# Patient Record
Sex: FEMALE | Race: BLACK OR AFRICAN AMERICAN | Employment: FULL TIME | URBAN - METROPOLITAN AREA
[De-identification: names, ages, dates, MRNs, and addresses within clinical notes are randomized per-mention and may not be internally consistent; named-entity substitution may affect disease eponyms.]

---

## 2017-02-20 ENCOUNTER — TELEPHONE (OUTPATIENT)
Dept: OBGYN CLINIC | Facility: CLINIC | Age: 37
End: 2017-02-20

## 2017-02-20 NOTE — TELEPHONE ENCOUNTER
Per pt she has been on Necon ocp since 8 weeks. For past three months started having irregular menses. Wasn't supposed to start until next week. Having bleeding periods, reported mild cramping as well.  Finds it very unusual. Per pt she has been taking N

## 2017-02-21 NOTE — TELEPHONE ENCOUNTER
Sound like breakthrough bleeding. Recommend check pregnancy test, either home or office. Continue OCPs. Come see me after this next menses. May need switch to different ocp.

## 2017-02-21 NOTE — TELEPHONE ENCOUNTER
Pt informed of AJB response, verbalized understanding. Will be taking a pregnancy test at home. Apt made to follow up with OSWALDO in office 3/9/17.

## 2017-03-09 ENCOUNTER — OFFICE VISIT (OUTPATIENT)
Dept: OBGYN CLINIC | Facility: CLINIC | Age: 37
End: 2017-03-09

## 2017-03-09 VITALS — SYSTOLIC BLOOD PRESSURE: 123 MMHG | BODY MASS INDEX: 32 KG/M2 | DIASTOLIC BLOOD PRESSURE: 86 MMHG | WEIGHT: 202 LBS

## 2017-03-09 DIAGNOSIS — N93.9 ABNORMAL UTERINE BLEEDING (AUB): Primary | ICD-10-CM

## 2017-03-09 PROCEDURE — 99213 OFFICE O/P EST LOW 20 MIN: CPT | Performed by: OBSTETRICS & GYNECOLOGY

## 2017-03-10 NOTE — PROGRESS NOTES
HPI:    Patient ID: Sarah Burch is a 39year old female. HPI  Gyne problem visit  Abnormal uterine bleeding. Patient on ocp Necon. Has noticed last menses to be heavy with two heavy days and bled similar to a period the week before LMP of 2/28.   H uterine bleeding (aub)  (primary encounter diagnosis)  Recommend pelvic ultrasound to check endometrium and fibroid. Endometrial biopsy.   If endometrial mass or lesion seen, would consider hysteroscopy/D & C instead of endo bx    Meds This Visit:  No pres

## 2017-03-28 RX ORDER — NORETHINDRONE AND ETHINYL ESTRADIOL 0.5-0.035
KIT ORAL
Qty: 28 TABLET | Refills: 0 | Status: SHIPPED | OUTPATIENT
Start: 2017-03-28 | End: 2017-04-17

## 2017-03-30 ENCOUNTER — TELEPHONE (OUTPATIENT)
Dept: OBGYN CLINIC | Facility: CLINIC | Age: 37
End: 2017-03-30

## 2017-03-30 NOTE — TELEPHONE ENCOUNTER
Pt not home. Message left with her mother to return call to this office . Pt's mother verbalizes understanding.

## 2017-03-30 NOTE — TELEPHONE ENCOUNTER
PT NO SHOWED ENDO BX WITH DR. Joseph Alberts. TRIED TO CALL PT TO R/S BUT PT PICKED UP AND DIDNT SPEAK.

## 2017-03-31 NOTE — TELEPHONE ENCOUNTER
Please schedule pt for EMBX and SONO HYST (procedure 20 minutes) with Dr. Michaelle Mascorro  Left message for pt to call us back, pt no showed

## 2017-04-13 ENCOUNTER — TELEPHONE (OUTPATIENT)
Dept: OBGYN CLINIC | Facility: CLINIC | Age: 37
End: 2017-04-13

## 2017-04-13 ENCOUNTER — OFFICE VISIT (OUTPATIENT)
Dept: OBGYN CLINIC | Facility: CLINIC | Age: 37
End: 2017-04-13

## 2017-04-13 ENCOUNTER — HOSPITAL ENCOUNTER (OUTPATIENT)
Dept: ULTRASOUND IMAGING | Facility: HOSPITAL | Age: 37
Discharge: HOME | End: 2017-04-13
Attending: OBSTETRICS & GYNECOLOGY
Payer: COMMERCIAL

## 2017-04-13 VITALS — DIASTOLIC BLOOD PRESSURE: 82 MMHG | BODY MASS INDEX: 31 KG/M2 | WEIGHT: 200 LBS | SYSTOLIC BLOOD PRESSURE: 124 MMHG

## 2017-04-13 DIAGNOSIS — N92.1 METRORRHAGIA: ICD-10-CM

## 2017-04-13 DIAGNOSIS — N93.9 ABNORMAL UTERINE BLEEDING (AUB): ICD-10-CM

## 2017-04-13 DIAGNOSIS — N92.1 METRORRHAGIA: Primary | ICD-10-CM

## 2017-04-13 PROCEDURE — 81025 URINE PREGNANCY TEST: CPT | Performed by: OBSTETRICS & GYNECOLOGY

## 2017-04-13 PROCEDURE — 76830 TRANSVAGINAL US NON-OB: CPT

## 2017-04-13 PROCEDURE — 76856 US EXAM PELVIC COMPLETE: CPT

## 2017-04-13 PROCEDURE — 58100 BIOPSY OF UTERUS LINING: CPT | Performed by: OBSTETRICS & GYNECOLOGY

## 2017-04-13 PROCEDURE — 88305 TISSUE EXAM BY PATHOLOGIST: CPT | Performed by: OBSTETRICS & GYNECOLOGY

## 2017-04-13 PROCEDURE — 99212 OFFICE O/P EST SF 10 MIN: CPT | Performed by: OBSTETRICS & GYNECOLOGY

## 2017-04-13 RX ORDER — HYDROCODONE BITARTRATE AND IBUPROFEN 7.5; 2 MG/1; MG/1
TABLET, FILM COATED ORAL
COMMUNITY
Start: 2017-03-15

## 2017-04-13 NOTE — TELEPHONE ENCOUNTER
Informed pt that Dr Negar Hernández agreed for her to have a note to be off work tonight, but not tomorrow.  Pt would like to, again, request a note to be off work for tomorrow due to bleeding and cramping from endometrial biopsyPt voices saturating a sanitary pad in

## 2017-04-13 NOTE — PROGRESS NOTES
HPI:    Patient ID: Arturo Gerardo is a 39year old female. HPI  Presents for transvag u/s/saline sono + endo bx. Unable to do transvag u/s today due to new rule suspending TVUS until new probe sanitizing standards are completed.   Patient informed of this encounter.        Meds This Visit:  No prescriptions requested or ordered in this encounter       Imaging & Referrals:  None       XB#[id number]

## 2017-04-13 NOTE — PROCEDURES
Consent obtained and time out done. Pregnancy test negative (for premenopausal)    In lithotomy position a Graves (or Isaias) speculum was placed and the cervix visualized. The cervix and vagina were prepped with Betadine.     Endometrial biopsy was per

## 2017-04-13 NOTE — TELEPHONE ENCOUNTER
PT JUST HAD A PROCEDURE DONE TODAY, WOULD LIKE A NOTE TO BE OFF FROM WORK 2 NIGHTS, PT WORK ON HER FEET ALL DAY

## 2017-04-17 ENCOUNTER — TELEPHONE (OUTPATIENT)
Dept: OBGYN CLINIC | Facility: CLINIC | Age: 37
End: 2017-04-17

## 2017-04-17 NOTE — TELEPHONE ENCOUNTER
Informed pt of Dr. Caroline Purvis new order for her. Informed pt her EMBx did not show any hyperplasia or suspicious tissue. Informed pt that Dr Jorgito Whipple ordered her a new BCP, Ortho-Novum 7/7/7. She should begin her new cycle of Ortho-Novum this Sunday 4/23/17. Hopefully this will improve her menstrual cycle over the next  2 months. Informed pt to follow up with Dr. Jorgito Whipple after her next 2 cycles if needed. Pt verbalizes understanding to theses orders. Pt currently voices she is saturating 1 sanitary pad in 1 1/2 hours. No clots noted. Pt, due to the heaviness of her menstrual cycle and abdominal cramps, is requesting a letter to be off work for the rest of the week. Can she have a letter for work? Please advise.

## 2017-04-17 NOTE — TELEPHONE ENCOUNTER
Please inform the patient that the results of her endometrial biopsy did not show any hyperplasia or suspicious tissue. At the conclusion of her current pill package, I would like to switch her to a different oral contraceptive. ERx sent for Ortho-Novum 7/7/7. Should hopefully improve menstrual cycles over the next 2 months. Should follow-up with me after the next 2 cycles to evaluate if needed.

## 2017-04-18 NOTE — TELEPHONE ENCOUNTER
Patient aware letter ready for her work, she will pick it up today or tmrw, no further questions at this time

## 2017-04-24 ENCOUNTER — TELEPHONE (OUTPATIENT)
Dept: OBGYN CLINIC | Facility: CLINIC | Age: 37
End: 2017-04-24

## 2017-04-25 NOTE — TELEPHONE ENCOUNTER
Spoke w/ pt & reminded of need for annual & pap. Scheduled appt for pt at her request. Although pt usually sees [facility], pt only has Thursdays available & requested to schedule with another provider.  Appt made w/ [facility]

## 2017-05-04 ENCOUNTER — TELEPHONE (OUTPATIENT)
Dept: OBGYN CLINIC | Facility: CLINIC | Age: 37
End: 2017-05-04

## 2017-05-04 ENCOUNTER — LAB ENCOUNTER (OUTPATIENT)
Dept: LAB | Facility: HOSPITAL | Age: 37
End: 2017-05-04
Attending: ADVANCED PRACTICE MIDWIFE
Payer: COMMERCIAL

## 2017-05-04 ENCOUNTER — OFFICE VISIT (OUTPATIENT)
Dept: OBGYN CLINIC | Facility: CLINIC | Age: 37
End: 2017-05-04

## 2017-05-04 VITALS
WEIGHT: 197.25 LBS | HEIGHT: 67 IN | BODY MASS INDEX: 30.96 KG/M2 | SYSTOLIC BLOOD PRESSURE: 130 MMHG | DIASTOLIC BLOOD PRESSURE: 85 MMHG | HEART RATE: 101 BPM

## 2017-05-04 DIAGNOSIS — Z13.29 SCREENING FOR THYROID DISORDER: ICD-10-CM

## 2017-05-04 DIAGNOSIS — Z13.1 SCREENING FOR DIABETES MELLITUS: ICD-10-CM

## 2017-05-04 DIAGNOSIS — Z01.419 WOMEN'S ANNUAL ROUTINE GYNECOLOGICAL EXAMINATION: Primary | ICD-10-CM

## 2017-05-04 DIAGNOSIS — Z13.1 SCREENING FOR DIABETES MELLITUS: Primary | ICD-10-CM

## 2017-05-04 PROCEDURE — 36415 COLL VENOUS BLD VENIPUNCTURE: CPT

## 2017-05-04 PROCEDURE — 84443 ASSAY THYROID STIM HORMONE: CPT

## 2017-05-04 PROCEDURE — 83036 HEMOGLOBIN GLYCOSYLATED A1C: CPT

## 2017-05-04 PROCEDURE — 99395 PREV VISIT EST AGE 18-39: CPT | Performed by: ADVANCED PRACTICE MIDWIFE

## 2017-05-04 NOTE — TELEPHONE ENCOUNTER
----- Message from MICHELLE Vigil sent at 5/4/2017  4:02 PM CDT -----  Please inform patient that her thyroid function was normal as well as her diabetes screening. Thanks.

## 2017-05-04 NOTE — PROGRESS NOTES
HPI:    Patient ID: Kim Macdonald is a 39year old female. HPI Comments: She presents for annual exam. She denies history of medical problems or any health complaints. Last pap 4/16 +hyperkeratosis, otherwise normal and negative HPV.   Denies history Montelukast Sodium 10 MG Oral Tab Take 1 tablet (10 mg total) by mouth nightly. Disp: 30 tablet Rfl: 0   Mometasone Furoate (NASONEX) 50 MCG/ACT Nasal Suspension 2 sprays by Nasal route 2 (two) times daily.  Disp: 1 Inhaler Rfl: 0   Levocetirizine Dihydro bleeding in the vagina. No foreign body around the vagina. No signs of injury around the vagina. No vaginal discharge found. Friable cervix   Musculoskeletal: Normal range of motion. Lymphadenopathy:     She has no cervical adenopathy.         Right: No

## 2017-05-05 NOTE — TELEPHONE ENCOUNTER
Informed pt that thyroid function was normal and diabetes screening was normal. Pt verbalized understanding and agrees with plan.

## 2017-05-09 ENCOUNTER — TELEPHONE (OUTPATIENT)
Dept: OBGYN CLINIC | Facility: CLINIC | Age: 37
End: 2017-05-09

## 2017-05-09 NOTE — TELEPHONE ENCOUNTER
----- Message from MICHELLE Andrea sent at 5/9/2017 11:13 AM CDT -----  Please inform patient that her pap was normal and negative for HPV but that it still demonstrated inflammation or hyperkeratosis.   I would like to repeat in 1 more year, if that fi

## 2017-05-10 NOTE — TELEPHONE ENCOUNTER
Spoke w/ pt & advised of normal pap & neg HPV w/ demonstrated inflammation per [facility]. Informed pt of 's recs . Pt would like to have prescription for estrogen cream available now.  Explained to pt that [facility] would be out of the office until tomorrow but ll send

## 2017-05-11 NOTE — TELEPHONE ENCOUNTER
Reviewed management options for hyperkeratosis and relative non-concern of finding. Discussed with patient's history of maternal breast CA at age 38 estrogen therapy would best be avoided. Plan to repeat pap in 1 year.  Patient stated understanding and ag

## 2017-06-12 ENCOUNTER — TELEPHONE (OUTPATIENT)
Dept: ALLERGY | Facility: CLINIC | Age: 37
End: 2017-06-12

## 2017-06-12 NOTE — TELEPHONE ENCOUNTER
Dr. Johann Obrien, order reminder letter mailed to home address on file and labs postponed x 3 months ordered on 12/13/16.

## 2017-09-14 RX ORDER — VERAPAMIL HCL 80 MG
TABLET ORAL
Qty: 8.7 G | Refills: 0 | Status: SHIPPED | OUTPATIENT
Start: 2017-09-14

## 2017-09-14 NOTE — TELEPHONE ENCOUNTER
Refill requested for QVAR 80MCG ORAL INHALER 120 DOSES  Will file in chart as: QVAR 80 MCG/ACT Inhalation Aero Soln  INHALE 2 PUFFS BY MOUTH INTO THE LUNGS TWICE DAILY       Disp: [id number] g Refills: 0    Class: Normal Start: 9/13/2017   Originally orde

## 2017-09-14 NOTE — TELEPHONE ENCOUNTER
LM notifying patient that she must be seen in office before additional refills can be sent to pharmacy after this 30 day supply. PSR please schedule a 15 minute follow up appointment with Dr. Kallie Martinez.  Thank you

## 2017-09-14 NOTE — TELEPHONE ENCOUNTER
Call reviewed and noted. Qvar refilled ×1. Patient last seen in December 2016 and overdue for six-month follow-up. Please call to schedule a follow-up visit.   No further refills until patient is seen in office

## 2018-02-08 ENCOUNTER — HOSPITAL (OUTPATIENT)
Dept: OTHER | Age: 38
End: 2018-02-08
Attending: OTOLARYNGOLOGY

## 2018-06-25 ENCOUNTER — HOSPITAL ENCOUNTER (EMERGENCY)
Facility: HOSPITAL | Age: 38
Discharge: HOME | End: 2018-06-25
Attending: EMERGENCY MEDICINE
Payer: COMMERCIAL

## 2018-06-25 VITALS
RESPIRATION RATE: 20 BRPM | WEIGHT: 195 LBS | HEIGHT: 67 IN | BODY MASS INDEX: 30.61 KG/M2 | OXYGEN SATURATION: 98 % | TEMPERATURE: 98 F | HEART RATE: 98 BPM | SYSTOLIC BLOOD PRESSURE: 135 MMHG | DIASTOLIC BLOOD PRESSURE: 99 MMHG

## 2018-06-25 DIAGNOSIS — H10.12 ALLERGIC CONJUNCTIVITIS OF LEFT EYE: Primary | ICD-10-CM

## 2018-06-25 PROCEDURE — 99283 EMERGENCY DEPT VISIT LOW MDM: CPT

## 2018-06-25 RX ORDER — KETOTIFEN FUMARATE 0.35 MG/ML
1 SOLUTION/ DROPS OPHTHALMIC 2 TIMES DAILY
Qty: 5 ML | Refills: 0 | Status: SHIPPED | OUTPATIENT
Start: 2018-06-25

## 2018-06-25 NOTE — ED PROVIDER NOTES
Patient Seen in: [facility] AND [facility] Emergency Department    History   Patient presents with:   Eye Visual Problem (opthalmic)    Stated Complaint: left eye problem    HPI    42-year-old female with past medical history significant for asthma presents emerg exudates  Eyes: EOM are normal. PERRLA, left eye with mild scleral erythema and injection in the inferior lateral aspect of the sclera, there is no fluorescein uptake and both eyes have normal visual acuity  Neck: Normal range of motion. Neck supple.  No tr

## 2018-06-25 NOTE — ED INITIAL ASSESSMENT (HPI)
Pt presents to the ED for the c/o left eye redness to the cornea.  Also reports scratchy/stabbing pain

## 2019-06-10 ENCOUNTER — HOSPITAL ENCOUNTER (EMERGENCY)
Facility: HOSPITAL | Age: 39
Discharge: HOME | End: 2019-06-10
Attending: EMERGENCY MEDICINE
Payer: COMMERCIAL

## 2019-06-10 VITALS
WEIGHT: 195 LBS | HEIGHT: 67 IN | BODY MASS INDEX: 30.61 KG/M2 | SYSTOLIC BLOOD PRESSURE: 131 MMHG | RESPIRATION RATE: 18 BRPM | DIASTOLIC BLOOD PRESSURE: 109 MMHG | OXYGEN SATURATION: 100 % | HEART RATE: 107 BPM | TEMPERATURE: 99 F

## 2019-06-10 DIAGNOSIS — H10.33 ACUTE CONJUNCTIVITIS OF BOTH EYES, UNSPECIFIED ACUTE CONJUNCTIVITIS TYPE: Primary | ICD-10-CM

## 2019-06-10 DIAGNOSIS — H11.423 CHEMOSIS OF CONJUNCTIVA OF BOTH EYES: ICD-10-CM

## 2019-06-10 PROCEDURE — 99283 EMERGENCY DEPT VISIT LOW MDM: CPT

## 2019-06-10 RX ORDER — TOBRAMYCIN AND DEXAMETHASONE 3; 1 MG/ML; MG/ML
2 SUSPENSION/ DROPS OPHTHALMIC
Qty: 1 BOTTLE | Refills: 0 | Status: SHIPPED | OUTPATIENT
Start: 2019-06-10 | End: 2019-06-15

## 2019-06-10 RX ORDER — OLOPATADINE HYDROCHLORIDE 1 MG/ML
1 SOLUTION/ DROPS OPHTHALMIC 2 TIMES DAILY
Qty: 5 ML | Refills: 0 | Status: SHIPPED | OUTPATIENT
Start: 2019-06-10

## 2019-06-10 NOTE — ED INITIAL ASSESSMENT (HPI)
Bilateral eye swelling, burning and pain since yesterday.  + sensitivity to light. +tearing and crusting

## 2019-06-10 NOTE — ED PROVIDER NOTES
Patient Seen in: [facility] AND [facility] Emergency Department    History   Patient presents with:  Eye Pain    Stated Complaint: burning eyes    HPI    27-year-old female with no significant past medical history here with complaints of bilateral eye swelling, HPI.  Constitutional and vital signs reviewed. All other systems reviewed and negative except as noted above.     Physical Exam     ED Triage Vitals [06/10/19 1354]   /79   Pulse 114   Resp 20   Temp 99.3 °F (37.4 °C)   Temp src Oral   SpO2 98 % hour(s)). Imaging Results Available and Reviewed by me while in ED:          EMERGENCY DEPARTMENT COURSE AND TREATMENT:  Patient's condition was stable during Emergency Department evaluation.      38yoF with b/l eye redness/swelling  - I personally destiny Ophthalmic Suspension  Apply 2 drops to eye every 2 (two) hours while awake for 5 days. , Print Script, Disp-1 Bottle, R-0    Olopatadine HCl (PATANOL) 0.1 % Ophthalmic Solution  Place 1 drop into both eyes 2 (two) times daily. , Normal, Disp-5 mL, R-0

## 2019-08-30 ENCOUNTER — HOSPITAL ENCOUNTER (EMERGENCY)
Facility: HOSPITAL | Age: 39
Discharge: HOME | End: 2019-08-30
Attending: EMERGENCY MEDICINE
Payer: COMMERCIAL

## 2019-08-30 VITALS
DIASTOLIC BLOOD PRESSURE: 105 MMHG | OXYGEN SATURATION: 99 % | HEART RATE: 98 BPM | TEMPERATURE: 98 F | RESPIRATION RATE: 20 BRPM | SYSTOLIC BLOOD PRESSURE: 153 MMHG

## 2019-08-30 DIAGNOSIS — R11.2 NAUSEA VOMITING AND DIARRHEA: Primary | ICD-10-CM

## 2019-08-30 DIAGNOSIS — R19.7 NAUSEA VOMITING AND DIARRHEA: Primary | ICD-10-CM

## 2019-08-30 DIAGNOSIS — R10.9 ABDOMINAL CRAMPING: ICD-10-CM

## 2019-08-30 LAB
ANION GAP SERPL CALC-SCNC: 9 MMOL/L (ref 0–18)
B-HCG UR QL: NEGATIVE
BASOPHILS # BLD AUTO: 0.01 X10(3) UL (ref 0–0.2)
BASOPHILS NFR BLD AUTO: 0.1 %
BILIRUB UR QL: NEGATIVE
BUN BLD-MCNC: 5 MG/DL (ref 7–18)
BUN/CREAT SERPL: 5.5 (ref 10–20)
CALCIUM BLD-MCNC: 9.2 MG/DL (ref 8.5–10.1)
CHLORIDE SERPL-SCNC: 105 MMOL/L (ref 98–112)
CO2 SERPL-SCNC: 24 MMOL/L (ref 21–32)
COLOR UR: YELLOW
CREAT BLD-MCNC: 0.91 MG/DL (ref 0.55–1.02)
DEPRECATED RDW RBC AUTO: 43.8 FL (ref 35.1–46.3)
EOSINOPHIL # BLD AUTO: 0.06 X10(3) UL (ref 0–0.7)
EOSINOPHIL NFR BLD AUTO: 0.6 %
ERYTHROCYTE [DISTWIDTH] IN BLOOD BY AUTOMATED COUNT: 14.3 % (ref 11–15)
GLUCOSE BLD-MCNC: 101 MG/DL (ref 70–99)
GLUCOSE UR-MCNC: NEGATIVE MG/DL
HCT VFR BLD AUTO: 39.6 % (ref 35–48)
HGB BLD-MCNC: 12.5 G/DL (ref 12–16)
IMM GRANULOCYTES # BLD AUTO: 0.03 X10(3) UL (ref 0–1)
IMM GRANULOCYTES NFR BLD: 0.3 %
KETONES UR-MCNC: 20 MG/DL
LEUKOCYTE ESTERASE UR QL STRIP.AUTO: NEGATIVE
LYMPHOCYTES # BLD AUTO: 1.34 X10(3) UL (ref 1–4)
LYMPHOCYTES NFR BLD AUTO: 14.2 %
MCH RBC QN AUTO: 26.6 PG (ref 26–34)
MCHC RBC AUTO-ENTMCNC: 31.6 G/DL (ref 31–37)
MCV RBC AUTO: 84.3 FL (ref 80–100)
MONOCYTES # BLD AUTO: 0.51 X10(3) UL (ref 0.1–1)
MONOCYTES NFR BLD AUTO: 5.4 %
NEUTROPHILS # BLD AUTO: 7.49 X10 (3) UL (ref 1.5–7.7)
NEUTROPHILS # BLD AUTO: 7.49 X10(3) UL (ref 1.5–7.7)
NEUTROPHILS NFR BLD AUTO: 79.4 %
NITRITE UR QL STRIP.AUTO: NEGATIVE
OSMOLALITY SERPL CALC.SUM OF ELEC: 283 MOSM/KG (ref 275–295)
PH UR: 5 [PH] (ref 5–8)
PLATELET # BLD AUTO: 403 10(3)UL (ref 150–450)
POTASSIUM SERPL-SCNC: 3.8 MMOL/L (ref 3.5–5.1)
PROT UR-MCNC: NEGATIVE MG/DL
RBC # BLD AUTO: 4.7 X10(6)UL (ref 3.8–5.3)
RBC #/AREA URNS AUTO: 5 /HPF
SODIUM SERPL-SCNC: 138 MMOL/L (ref 136–145)
SP GR UR STRIP: 1.01 (ref 1–1.03)
UROBILINOGEN UR STRIP-ACNC: <2
VIT C UR-MCNC: NEGATIVE MG/DL
WBC # BLD AUTO: 9.4 X10(3) UL (ref 4–11)
WBC #/AREA URNS AUTO: 2 /HPF

## 2019-08-30 PROCEDURE — 81025 URINE PREGNANCY TEST: CPT

## 2019-08-30 PROCEDURE — 96374 THER/PROPH/DIAG INJ IV PUSH: CPT

## 2019-08-30 PROCEDURE — 99284 EMERGENCY DEPT VISIT MOD MDM: CPT

## 2019-08-30 PROCEDURE — 85025 COMPLETE CBC W/AUTO DIFF WBC: CPT | Performed by: EMERGENCY MEDICINE

## 2019-08-30 PROCEDURE — 80048 BASIC METABOLIC PNL TOTAL CA: CPT | Performed by: EMERGENCY MEDICINE

## 2019-08-30 PROCEDURE — 81001 URINALYSIS AUTO W/SCOPE: CPT | Performed by: EMERGENCY MEDICINE

## 2019-08-30 RX ORDER — DICYCLOMINE HCL 20 MG
20 TABLET ORAL 4 TIMES DAILY PRN
Qty: 30 TABLET | Refills: 0 | Status: SHIPPED | OUTPATIENT
Start: 2019-08-30

## 2019-08-30 RX ORDER — ONDANSETRON 2 MG/ML
4 INJECTION INTRAMUSCULAR; INTRAVENOUS ONCE
Status: COMPLETED | OUTPATIENT
Start: 2019-08-30 | End: 2019-08-30

## 2019-08-30 RX ORDER — DICYCLOMINE HCL 20 MG
20 TABLET ORAL ONCE
Status: COMPLETED | OUTPATIENT
Start: 2019-08-30 | End: 2019-08-30

## 2019-08-30 RX ORDER — ONDANSETRON 4 MG/1
4 TABLET, ORALLY DISINTEGRATING ORAL EVERY 4 HOURS PRN
Qty: 15 TABLET | Refills: 0 | Status: SHIPPED | OUTPATIENT
Start: 2019-08-30

## 2019-08-31 NOTE — ED NOTES
PT IS HERE WITH REPORTED FOOD POISONING. PT STS  HAVING VOMITING AND DIARRHEA SINCE Tuesday. PT STS HAVING 5 EPISODES OF VOMITING AND THE DIARRHEA HOURLY. PT STS EATING OUT. PT DENIED FEVERS.

## 2019-08-31 NOTE — ED PROVIDER NOTES
Patient Seen in: [facility] Emergency Department    History   Patient presents with:  Abdomen/Flank Pain (GI/)  Nausea/Vomiting/Diarrhea (gastrointestinal)      HPI    Patient presents to the ED complaining of intermittent abdominal cramping and (Room air)       Physical Exam   Constitutional: She is oriented to person, place, and time. She appears well-developed. No distress. Obese   HENT:   Head: Normocephalic and atraumatic. Eyes: Conjunctivae are normal. Right eye exhibits no discharge.  Radha Herrera CBC W/ DIFFERENTIAL         Imaging Results Available and Reviewed while in ED:     ED Medications Administered:   Medications   ondansetron HCl (ZOFRAN) injection 4 mg (4 mg Intravenous Given 8/30/19 2108)   sodium chloride 0.9% IV bolus 1,000 mL (0 mL Medication List as of 8/30/2019  9:32 PM    START taking these medications    ondansetron 4 MG Oral Tablet Dispersible  Take 1 tablet (4 mg total) by mouth every 4 (four) hours as needed for Nausea. , Print, Disp-15 tablet, R-0    Dicyclomine HCl 20 MG Oral

## 2019-09-01 ENCOUNTER — APPOINTMENT (OUTPATIENT)
Dept: CT IMAGING | Facility: HOSPITAL | Age: 39
End: 2019-09-01
Attending: EMERGENCY MEDICINE
Payer: COMMERCIAL

## 2019-09-01 ENCOUNTER — HOSPITAL ENCOUNTER (EMERGENCY)
Facility: HOSPITAL | Age: 39
Discharge: HOME | End: 2019-09-01
Attending: EMERGENCY MEDICINE
Payer: COMMERCIAL

## 2019-09-01 VITALS
WEIGHT: 185 LBS | DIASTOLIC BLOOD PRESSURE: 78 MMHG | RESPIRATION RATE: 18 BRPM | OXYGEN SATURATION: 98 % | TEMPERATURE: 98 F | BODY MASS INDEX: 29 KG/M2 | SYSTOLIC BLOOD PRESSURE: 122 MMHG | HEART RATE: 128 BPM

## 2019-09-01 DIAGNOSIS — R19.7 NAUSEA VOMITING AND DIARRHEA: Primary | ICD-10-CM

## 2019-09-01 DIAGNOSIS — R11.2 NAUSEA VOMITING AND DIARRHEA: Primary | ICD-10-CM

## 2019-09-01 DIAGNOSIS — R10.30 LOWER ABDOMINAL PAIN: ICD-10-CM

## 2019-09-01 PROCEDURE — 74177 CT ABD & PELVIS W/CONTRAST: CPT | Performed by: EMERGENCY MEDICINE

## 2019-09-01 PROCEDURE — 96375 TX/PRO/DX INJ NEW DRUG ADDON: CPT

## 2019-09-01 PROCEDURE — 99285 EMERGENCY DEPT VISIT HI MDM: CPT

## 2019-09-01 PROCEDURE — 96374 THER/PROPH/DIAG INJ IV PUSH: CPT

## 2019-09-01 PROCEDURE — 94640 AIRWAY INHALATION TREATMENT: CPT

## 2019-09-01 PROCEDURE — 96361 HYDRATE IV INFUSION ADD-ON: CPT

## 2019-09-01 RX ORDER — TRAMADOL HYDROCHLORIDE 50 MG/1
50 TABLET ORAL ONCE
Status: COMPLETED | OUTPATIENT
Start: 2019-09-01 | End: 2019-09-01

## 2019-09-01 RX ORDER — DEXAMETHASONE SODIUM PHOSPHATE 10 MG/ML
10 INJECTION, SOLUTION INTRAMUSCULAR; INTRAVENOUS ONCE
Status: COMPLETED | OUTPATIENT
Start: 2019-09-01 | End: 2019-09-01

## 2019-09-01 RX ORDER — IPRATROPIUM BROMIDE AND ALBUTEROL SULFATE 2.5; .5 MG/3ML; MG/3ML
3 SOLUTION RESPIRATORY (INHALATION) ONCE
Status: COMPLETED | OUTPATIENT
Start: 2019-09-01 | End: 2019-09-01

## 2019-09-01 RX ORDER — KETOROLAC TROMETHAMINE 30 MG/ML
30 INJECTION, SOLUTION INTRAMUSCULAR; INTRAVENOUS ONCE
Status: COMPLETED | OUTPATIENT
Start: 2019-09-01 | End: 2019-09-01

## 2019-09-01 RX ORDER — TRAMADOL HYDROCHLORIDE 50 MG/1
50 TABLET ORAL ONCE
Status: DISCONTINUED | OUTPATIENT
Start: 2019-09-01 | End: 2019-09-01

## 2019-09-01 RX ORDER — ONDANSETRON 2 MG/ML
4 INJECTION INTRAMUSCULAR; INTRAVENOUS ONCE
Status: COMPLETED | OUTPATIENT
Start: 2019-09-01 | End: 2019-09-01

## 2019-09-01 RX ORDER — ONDANSETRON 4 MG/1
4 TABLET, ORALLY DISINTEGRATING ORAL EVERY 4 HOURS PRN
Qty: 15 TABLET | Refills: 0 | Status: SHIPPED | OUTPATIENT
Start: 2019-09-01

## 2019-09-01 RX ORDER — TRAMADOL HYDROCHLORIDE 50 MG/1
TABLET ORAL EVERY 6 HOURS PRN
Qty: 20 TABLET | Refills: 0 | Status: SHIPPED | OUTPATIENT
Start: 2019-09-01

## 2019-09-01 NOTE — ED INITIAL ASSESSMENT (HPI)
Pt c/o lower abdominal pain with NVD, was seen here yesterday for same problem. She states that pain is worse. Was prescribed zofran and dicyclomine yesterday, stats they are not helping.

## 2019-09-02 NOTE — ED PROVIDER NOTES
Patient Seen in: [facility] Emergency Department    History   Patient presents with:  Abdomen/Flank Pain (GI/)      HPI    Patient presents to the ED complaining of ongoing lower abdominal pain for the past 4 days.   Pain is crampy and comes and 20   Temp 98.3 °F (36.8 °C)   Temp src Oral   SpO2 100 %   O2 Device None (Room air)       Physical Exam   Constitutional: She is oriented to person, place, and time. She appears well-developed and well-nourished. No distress.    HENT:   Head: Normocephalic (ULTRAM) tab 50 mg (50 mg Oral Not Given 9/1/19 2010)   ondansetron HCl (ZOFRAN) injection 4 mg (4 mg Intravenous Given 9/1/19 1730)   sodium chloride 0.9% IV bolus 1,000 mL (0 mL Intravenous Stopped 9/1/19 1900)   ketorolac tromethamine (TORADOL) 30 MG/ML antiemetics and fluids. Advised on outpatient gastroenterology follow-up. Home on tramadol for pain control and continued Zofran. Additional verbal instructions were given and discussed with the patient and/or caregiver.       Condition upon leaving th

## 2019-09-02 NOTE — ED NOTES
Pt c/o wheezing and in need of her inhaler. Respiratory therapy called for duo neb. Pt's heart rate noted to be elevated. ER MD aware.

## 2019-09-03 ENCOUNTER — TELEPHONE (OUTPATIENT)
Dept: INTERNAL MEDICINE CLINIC | Facility: CLINIC | Age: 39
End: 2019-09-03

## 2019-09-03 NOTE — TELEPHONE ENCOUNTER
I have not seen this pt. She can see any PCP in the clinic. I have no new patient openings for next Thursday, but I often get cancellations. She could call Wednesday afternoon, or she could see another PCP.

## 2019-09-03 NOTE — TELEPHONE ENCOUNTER
Pt states she was in the emergency room and was told to follow up with her pcp. Per pt she was seen for food poisoning and also a cyst pt states she is off next Thursday 09/12/19 and would like to know if she can be added to schedule.

## 2020-08-02 ENCOUNTER — HOSPITAL ENCOUNTER (EMERGENCY)
Facility: HOSPITAL | Age: 40
Discharge: HOME | End: 2020-08-02
Attending: EMERGENCY MEDICINE
Payer: COMMERCIAL

## 2020-08-02 VITALS
DIASTOLIC BLOOD PRESSURE: 103 MMHG | HEART RATE: 102 BPM | TEMPERATURE: 99 F | RESPIRATION RATE: 20 BRPM | OXYGEN SATURATION: 98 % | SYSTOLIC BLOOD PRESSURE: 136 MMHG

## 2020-08-02 DIAGNOSIS — R11.2 NAUSEA VOMITING AND DIARRHEA: Primary | ICD-10-CM

## 2020-08-02 DIAGNOSIS — R19.7 NAUSEA VOMITING AND DIARRHEA: Primary | ICD-10-CM

## 2020-08-02 LAB
ALBUMIN SERPL-MCNC: 3.2 G/DL (ref 3.4–5)
ALP LIVER SERPL-CCNC: 71 U/L (ref 37–98)
ALT SERPL-CCNC: 16 U/L (ref 13–56)
ANION GAP SERPL CALC-SCNC: 4 MMOL/L (ref 0–18)
AST SERPL-CCNC: 12 U/L (ref 15–37)
B-HCG UR QL: NEGATIVE
BACTERIA UR QL AUTO: NEGATIVE /HPF
BASOPHILS # BLD AUTO: 0.02 X10(3) UL (ref 0–0.2)
BASOPHILS NFR BLD AUTO: 0.2 %
BILIRUB DIRECT SERPL-MCNC: 0.1 MG/DL (ref 0–0.2)
BILIRUB SERPL-MCNC: 0.4 MG/DL (ref 0.1–2)
BILIRUB UR QL: NEGATIVE
BUN BLD-MCNC: 5 MG/DL (ref 7–18)
BUN/CREAT SERPL: 6.1 (ref 10–20)
CALCIUM BLD-MCNC: 8.9 MG/DL (ref 8.5–10.1)
CHLORIDE SERPL-SCNC: 106 MMOL/L (ref 98–112)
CLARITY UR: CLEAR
CO2 SERPL-SCNC: 27 MMOL/L (ref 21–32)
COLOR UR: YELLOW
CREAT BLD-MCNC: 0.82 MG/DL (ref 0.55–1.02)
DEPRECATED RDW RBC AUTO: 45.3 FL (ref 35.1–46.3)
EOSINOPHIL # BLD AUTO: 0.12 X10(3) UL (ref 0–0.7)
EOSINOPHIL NFR BLD AUTO: 1.2 %
ERYTHROCYTE [DISTWIDTH] IN BLOOD BY AUTOMATED COUNT: 14.8 % (ref 11–15)
GLUCOSE BLD-MCNC: 101 MG/DL (ref 70–99)
GLUCOSE UR-MCNC: NEGATIVE MG/DL
HCT VFR BLD AUTO: 37.7 % (ref 35–48)
HGB BLD-MCNC: 12 G/DL (ref 12–16)
IMM GRANULOCYTES # BLD AUTO: 0.03 X10(3) UL (ref 0–1)
IMM GRANULOCYTES NFR BLD: 0.3 %
KETONES UR-MCNC: NEGATIVE MG/DL
LEUKOCYTE ESTERASE UR QL STRIP.AUTO: NEGATIVE
LIPASE SERPL-CCNC: 72 U/L (ref 73–393)
LYMPHOCYTES # BLD AUTO: 1.4 X10(3) UL (ref 1–4)
LYMPHOCYTES NFR BLD AUTO: 14.3 %
M PROTEIN MFR SERPL ELPH: 6.9 G/DL (ref 6.4–8.2)
MCH RBC QN AUTO: 26.5 PG (ref 26–34)
MCHC RBC AUTO-ENTMCNC: 31.8 G/DL (ref 31–37)
MCV RBC AUTO: 83.4 FL (ref 80–100)
MONOCYTES # BLD AUTO: 0.49 X10(3) UL (ref 0.1–1)
MONOCYTES NFR BLD AUTO: 5 %
NEUTROPHILS # BLD AUTO: 7.73 X10 (3) UL (ref 1.5–7.7)
NEUTROPHILS # BLD AUTO: 7.73 X10(3) UL (ref 1.5–7.7)
NEUTROPHILS NFR BLD AUTO: 79 %
NITRITE UR QL STRIP.AUTO: NEGATIVE
OSMOLALITY SERPL CALC.SUM OF ELEC: 281 MOSM/KG (ref 275–295)
PH UR: 6 [PH] (ref 5–8)
PLATELET # BLD AUTO: 508 10(3)UL (ref 150–450)
POTASSIUM SERPL-SCNC: 3.3 MMOL/L (ref 3.5–5.1)
PROT UR-MCNC: NEGATIVE MG/DL
RBC # BLD AUTO: 4.52 X10(6)UL (ref 3.8–5.3)
RBC #/AREA URNS AUTO: <1 /HPF
SODIUM SERPL-SCNC: 137 MMOL/L (ref 136–145)
SP GR UR STRIP: 1 (ref 1–1.03)
UROBILINOGEN UR STRIP-ACNC: <2
WBC # BLD AUTO: 9.8 X10(3) UL (ref 4–11)
WBC #/AREA URNS AUTO: 1 /HPF

## 2020-08-02 PROCEDURE — 83690 ASSAY OF LIPASE: CPT | Performed by: EMERGENCY MEDICINE

## 2020-08-02 PROCEDURE — 96361 HYDRATE IV INFUSION ADD-ON: CPT

## 2020-08-02 PROCEDURE — 80048 BASIC METABOLIC PNL TOTAL CA: CPT | Performed by: EMERGENCY MEDICINE

## 2020-08-02 PROCEDURE — 85025 COMPLETE CBC W/AUTO DIFF WBC: CPT | Performed by: EMERGENCY MEDICINE

## 2020-08-02 PROCEDURE — 81025 URINE PREGNANCY TEST: CPT

## 2020-08-02 PROCEDURE — 99284 EMERGENCY DEPT VISIT MOD MDM: CPT

## 2020-08-02 PROCEDURE — 96374 THER/PROPH/DIAG INJ IV PUSH: CPT

## 2020-08-02 PROCEDURE — 81001 URINALYSIS AUTO W/SCOPE: CPT | Performed by: EMERGENCY MEDICINE

## 2020-08-02 PROCEDURE — 80076 HEPATIC FUNCTION PANEL: CPT | Performed by: EMERGENCY MEDICINE

## 2020-08-02 PROCEDURE — 96372 THER/PROPH/DIAG INJ SC/IM: CPT

## 2020-08-02 RX ORDER — DICYCLOMINE HYDROCHLORIDE 10 MG/ML
20 INJECTION INTRAMUSCULAR ONCE
Status: COMPLETED | OUTPATIENT
Start: 2020-08-02 | End: 2020-08-02

## 2020-08-02 RX ORDER — POTASSIUM CHLORIDE 20 MEQ/1
40 TABLET, EXTENDED RELEASE ORAL ONCE
Status: COMPLETED | OUTPATIENT
Start: 2020-08-02 | End: 2020-08-02

## 2020-08-02 RX ORDER — DICYCLOMINE HCL 20 MG
20 TABLET ORAL 4 TIMES DAILY PRN
Qty: 30 TABLET | Refills: 0 | Status: SHIPPED | OUTPATIENT
Start: 2020-08-02 | End: 2020-09-01

## 2020-08-02 RX ORDER — ONDANSETRON 2 MG/ML
4 INJECTION INTRAMUSCULAR; INTRAVENOUS ONCE
Status: COMPLETED | OUTPATIENT
Start: 2020-08-02 | End: 2020-08-02

## 2020-08-02 RX ORDER — ONDANSETRON 4 MG/1
4 TABLET, ORALLY DISINTEGRATING ORAL EVERY 4 HOURS PRN
Qty: 10 TABLET | Refills: 0 | Status: SHIPPED | OUTPATIENT
Start: 2020-08-02 | End: 2020-08-09

## 2020-08-02 NOTE — ED PROVIDER NOTES
Patient Seen in: [facility] AND [facility] Emergency Department      History   Patient presents with:  Abdomen/Flank Pain    Stated Complaint: abd pain    HPI    21-year-old female with asthma presenting for evaluation of nausea vomiting and diarrhea with interm of motion. Cardiovascular:      Rate and Rhythm: Normal rate and regular rhythm. Heart sounds: Normal heart sounds. Pulmonary:      Effort: Pulmonary effort is normal.      Breath sounds: Normal breath sounds.    Abdominal:      General: There is n -----------         ------                     CBC W/ DIFFERENTIAL[[id number]]          Abnormal            Final result                 Please view results for these tests on the individual orders.                   MDM   Well-appearing, well-hydrated 40-ye

## 2020-08-03 ENCOUNTER — HOSPITAL ENCOUNTER (EMERGENCY)
Facility: HOSPITAL | Age: 40
Discharge: HOME | End: 2020-08-04
Attending: EMERGENCY MEDICINE
Payer: COMMERCIAL

## 2020-08-03 ENCOUNTER — APPOINTMENT (OUTPATIENT)
Dept: CT IMAGING | Facility: HOSPITAL | Age: 40
End: 2020-08-03
Attending: EMERGENCY MEDICINE
Payer: COMMERCIAL

## 2020-08-03 DIAGNOSIS — T78.40XA ALLERGIC REACTION, INITIAL ENCOUNTER: Primary | ICD-10-CM

## 2020-08-03 DIAGNOSIS — R10.9 ABDOMINAL PAIN OF UNKNOWN ETIOLOGY: ICD-10-CM

## 2020-08-03 PROCEDURE — 99284 EMERGENCY DEPT VISIT MOD MDM: CPT

## 2020-08-03 PROCEDURE — S0028 INJECTION, FAMOTIDINE, 20 MG: HCPCS

## 2020-08-03 PROCEDURE — 74177 CT ABD & PELVIS W/CONTRAST: CPT | Performed by: EMERGENCY MEDICINE

## 2020-08-03 PROCEDURE — 96361 HYDRATE IV INFUSION ADD-ON: CPT

## 2020-08-03 PROCEDURE — 96374 THER/PROPH/DIAG INJ IV PUSH: CPT

## 2020-08-03 RX ORDER — FAMOTIDINE 10 MG/ML
INJECTION, SOLUTION INTRAVENOUS
Status: COMPLETED
Start: 2020-08-03 | End: 2020-08-03

## 2020-08-03 RX ORDER — DIPHENHYDRAMINE HYDROCHLORIDE 50 MG/ML
INJECTION INTRAMUSCULAR; INTRAVENOUS
Status: COMPLETED
Start: 2020-08-03 | End: 2020-08-03

## 2020-08-03 RX ORDER — METHYLPREDNISOLONE SODIUM SUCCINATE 125 MG/2ML
125 INJECTION, POWDER, LYOPHILIZED, FOR SOLUTION INTRAMUSCULAR; INTRAVENOUS ONCE
Status: COMPLETED | OUTPATIENT
Start: 2020-08-03 | End: 2020-08-03

## 2020-08-03 RX ORDER — DIPHENHYDRAMINE HYDROCHLORIDE 50 MG/ML
25 INJECTION INTRAMUSCULAR; INTRAVENOUS ONCE
Status: COMPLETED | OUTPATIENT
Start: 2020-08-03 | End: 2020-08-03

## 2020-08-03 RX ORDER — METHYLPREDNISOLONE SODIUM SUCCINATE 125 MG/2ML
INJECTION, POWDER, LYOPHILIZED, FOR SOLUTION INTRAMUSCULAR; INTRAVENOUS
Status: COMPLETED
Start: 2020-08-03 | End: 2020-08-03

## 2020-08-03 RX ORDER — FAMOTIDINE 10 MG/ML
20 INJECTION, SOLUTION INTRAVENOUS ONCE
Status: COMPLETED | OUTPATIENT
Start: 2020-08-03 | End: 2020-08-03

## 2020-08-03 NOTE — ED NOTES
Pt provided and explained d/c instructions, at-home care, follow-up, and rx. Pt in nad at this time. Iv access d/c. Vss. Malloy. Ambulatory. A&ox3. Belongings with pt. All questions and concerns addressed.

## 2020-08-04 VITALS
OXYGEN SATURATION: 97 % | WEIGHT: 195 LBS | HEART RATE: 107 BPM | RESPIRATION RATE: 17 BRPM | TEMPERATURE: 99 F | DIASTOLIC BLOOD PRESSURE: 84 MMHG | SYSTOLIC BLOOD PRESSURE: 127 MMHG | BODY MASS INDEX: 31 KG/M2

## 2020-08-04 RX ORDER — CETIRIZINE HYDROCHLORIDE 10 MG/1
10 TABLET ORAL DAILY
Qty: 7 TABLET | Refills: 0 | Status: SHIPPED | OUTPATIENT
Start: 2020-08-04 | End: 2020-08-11

## 2020-08-04 RX ORDER — TRAMADOL HYDROCHLORIDE 50 MG/1
50 TABLET ORAL EVERY 6 HOURS PRN
Qty: 12 TABLET | Refills: 0 | Status: SHIPPED | OUTPATIENT
Start: 2020-08-04 | End: 2020-08-16

## 2020-08-04 RX ORDER — CETIRIZINE HYDROCHLORIDE 10 MG/1
10 TABLET ORAL 2 TIMES DAILY
Qty: 14 TABLET | Refills: 0 | Status: SHIPPED | OUTPATIENT
Start: 2020-08-04 | End: 2020-08-04

## 2020-08-04 RX ORDER — TRAMADOL HYDROCHLORIDE 50 MG/1
50 TABLET ORAL ONCE
Status: COMPLETED | OUTPATIENT
Start: 2020-08-04 | End: 2020-08-04

## 2020-08-04 NOTE — ED INITIAL ASSESSMENT (HPI)
Pt states she was here yesterday for abdominal pain, today her face started swelling 20 mins PTA. She has been taking bentyl/ zofran at home, +SOB. +n/v, pt states she is also still having the same abdominal pain that she was seen here for yesterday.

## 2020-08-04 NOTE — ED PROVIDER NOTES
Patient Seen in: [facility] AND [facility] Emergency Department    History   Patient presents with:   Allergic Rxn Allergies      HPI    Patient returns to the ED complaining of facial swelling and shortness of breath after taking Bentyl for the fourth time today Exam     ED Triage Vitals [08/03/20 2147]   /79   Pulse (!) 135   Resp 22   Temp 98.6 °F (37 °C)   Temp src Temporal   SpO2 96 %   O2 Device None (Room air)       All measures to prevent infection transmission during my interaction with the patient w 08/03/2020  Patient No:  [id number]  Physician:  Iram Cuellar  YOB: 1980    Past Medical History (entered by Technologist):    Reason For Exam (entered by Technologist):  Stoamch ache, allergic reaction, lower abd pain.   Other Note abdominal pain, gastroenteritis, food poisoning, diverticulitis, colitis    Medical Record Review: I personally reviewed available prior medical records for any recent pertinent discharge summaries, testing, and procedures and reviewed those reports.     Co

## 2020-08-04 NOTE — ED NOTES
Per Dr. Jeremiah Fuchs; OK for PT to have CT scan with contrast. BUN was completed yesterday. CT aware.

## 2022-05-22 ENCOUNTER — HOSPITAL ENCOUNTER (EMERGENCY)
Facility: HOSPITAL | Age: 42
Discharge: HOME | End: 2022-05-22
Payer: COMMERCIAL

## 2022-05-22 VITALS
TEMPERATURE: 97 F | RESPIRATION RATE: 20 BRPM | HEART RATE: 103 BPM | WEIGHT: 210 LBS | HEIGHT: 67 IN | BODY MASS INDEX: 32.96 KG/M2 | OXYGEN SATURATION: 97 % | DIASTOLIC BLOOD PRESSURE: 110 MMHG | SYSTOLIC BLOOD PRESSURE: 150 MMHG

## 2022-05-22 DIAGNOSIS — S05.01XA ABRASION OF RIGHT CORNEA, INITIAL ENCOUNTER: Primary | ICD-10-CM

## 2022-05-22 PROCEDURE — 99283 EMERGENCY DEPT VISIT LOW MDM: CPT

## 2022-05-22 RX ORDER — TETRACAINE HYDROCHLORIDE 5 MG/ML
1 SOLUTION OPHTHALMIC ONCE
Status: COMPLETED | OUTPATIENT
Start: 2022-05-22 | End: 2022-05-22

## 2022-05-22 RX ORDER — TETRACAINE HYDROCHLORIDE 5 MG/ML
2 SOLUTION OPHTHALMIC ONCE
Status: COMPLETED | OUTPATIENT
Start: 2022-05-22 | End: 2022-05-22

## 2022-05-22 RX ORDER — ERYTHROMYCIN 5 MG/G
1 OINTMENT OPHTHALMIC EVERY 6 HOURS
Qty: 1 G | Refills: 0 | Status: SHIPPED | OUTPATIENT
Start: 2022-05-22 | End: 2022-05-29

## 2022-05-22 RX ORDER — TRAMADOL HYDROCHLORIDE 50 MG/1
TABLET ORAL EVERY 6 HOURS PRN
Qty: 10 TABLET | Refills: 0 | Status: SHIPPED | OUTPATIENT
Start: 2022-05-22 | End: 2022-05-27

## 2022-05-22 NOTE — Clinical Note
Discharge instruction provided and reviewed with patient, including follow-up with. All questions answered. Patient instructed to return with worsening symptoms.  Patient verbalized understanding

## 2022-05-22 NOTE — ED INITIAL ASSESSMENT (HPI)
C/o bilateral eye stinging and burning that started last night, + sensitivity to light.  Patient stated she was cleaning yesterday and used fabuloso and bleach. + blurry vision

## 2022-07-16 NOTE — ED NOTES
PT came to this RN stating that her stomach was starting to hurt and that her pharmacy was closed. PT asking if she could have Tramadol dose here as she will not be able to  Rx until tomorrow AM. Dr. Aleena Colindres out of building for the evening.  This RN Problem: Depression - IP adult  Goal: Effects of depression will be minimized  Description: INTERVENTIONS:  - Assess impact of patient's symptoms on level of functioning, self-care needs and offer support as indicated  - Assess patient/family knowledge of depression, impact on illness and need for teaching  - Provide emotional support, presence and reassurance  - Assess for possible suicidal thoughts, ideation or self-harm   If patient expresses suicidal thoughts or statements do not leave alone, notify physician/AP immediately, initiate Suicide Precautions, and determine need for continual observation   - Initiate consults and referrals as appropriate (a mental health professional, Spiritual Care)  - Administer medication as ordered  Outcome: Progressing

## 2024-06-01 ENCOUNTER — OFFICE VISIT (OUTPATIENT)
Dept: FAMILY MEDICINE CLINIC | Facility: CLINIC | Age: 44
End: 2024-06-01
Payer: COMMERCIAL

## 2024-06-01 VITALS
RESPIRATION RATE: 18 BRPM | DIASTOLIC BLOOD PRESSURE: 90 MMHG | HEART RATE: 101 BPM | SYSTOLIC BLOOD PRESSURE: 133 MMHG | WEIGHT: 180 LBS | BODY MASS INDEX: 28.25 KG/M2 | OXYGEN SATURATION: 99 % | TEMPERATURE: 97 F | HEIGHT: 67 IN

## 2024-06-01 DIAGNOSIS — J45.909: ICD-10-CM

## 2024-06-01 DIAGNOSIS — J01.00 ACUTE NON-RECURRENT MAXILLARY SINUSITIS: Primary | ICD-10-CM

## 2024-06-01 DIAGNOSIS — J45.21 MILD INTERMITTENT ASTHMA WITH EXACERBATION (HCC): ICD-10-CM

## 2024-06-01 PROCEDURE — 3075F SYST BP GE 130 - 139MM HG: CPT | Performed by: FAMILY MEDICINE

## 2024-06-01 PROCEDURE — 99203 OFFICE O/P NEW LOW 30 MIN: CPT | Performed by: FAMILY MEDICINE

## 2024-06-01 PROCEDURE — 3080F DIAST BP >= 90 MM HG: CPT | Performed by: FAMILY MEDICINE

## 2024-06-01 PROCEDURE — 3008F BODY MASS INDEX DOCD: CPT | Performed by: FAMILY MEDICINE

## 2024-06-01 RX ORDER — ALBUTEROL SULFATE 2.5 MG/3ML
2.5 SOLUTION RESPIRATORY (INHALATION) EVERY 4 HOURS PRN
Qty: 100 EACH | Refills: 0 | Status: SHIPPED | OUTPATIENT
Start: 2024-06-01

## 2024-06-01 RX ORDER — ALBUTEROL SULFATE 90 UG/1
AEROSOL, METERED RESPIRATORY (INHALATION)
Qty: 1 EACH | Refills: 0 | Status: SHIPPED | OUTPATIENT
Start: 2024-06-01

## 2024-06-01 RX ORDER — PREDNISONE 20 MG/1
40 TABLET ORAL DAILY
Qty: 10 TABLET | Refills: 0 | Status: SHIPPED | OUTPATIENT
Start: 2024-06-01 | End: 2024-06-06

## 2024-06-01 RX ORDER — AMOXICILLIN AND CLAVULANATE POTASSIUM 875; 125 MG/1; MG/1
1 TABLET, FILM COATED ORAL 2 TIMES DAILY
Qty: 20 TABLET | Refills: 0 | Status: SHIPPED | OUTPATIENT
Start: 2024-06-01 | End: 2024-06-11

## 2024-06-01 NOTE — PROGRESS NOTES
Margarita Murray is a 43 year old female.    S:  Patient presents today with the following concerns:  Chief Complaint   Patient presents with    Sinus Problem     Started month ago   Sinus pressure   No fevers    Sinus pain and pressure, nasal congestion with green mucous.  Post nasal drainage.  Having asthma issues.  Wheezing at night.  Does not have an albuterol inhaler or nebs at home.    No fevers.  No N/V/D.       Current Outpatient Medications   Medication Sig Dispense Refill    amoxicillin clavulanate 875-125 MG Oral Tab Take 1 tablet by mouth 2 (two) times daily for 10 days. 20 tablet 0    predniSONE 20 MG Oral Tab Take 2 tablets (40 mg total) by mouth daily for 5 days. 10 tablet 0    albuterol (PROAIR HFA) 108 (90 Base) MCG/ACT Inhalation Aero Soln 1-2 puffs every 4-6 hours for 2-3 days then as needed. 1 each 0    albuterol (2.5 MG/3ML) 0.083% Inhalation Nebu Soln Take 3 mL (2.5 mg total) by nebulization every 4 (four) hours as needed for Wheezing. 100 each 0    Beclomethasone Diprop HFA 80 MCG/ACT Inhalation Aerosol, Breath Activated Inhale 2 puffs into the lungs in the morning and 2 puffs before bedtime. 1 each 0    Levocetirizine Dihydrochloride (XYZAL) 5 MG Oral Tab Take 1 tablet (5 mg total) by mouth nightly. 30 tablet 0    ondansetron 4 MG Oral Tablet Dispersible Take 1 tablet (4 mg total) by mouth every 4 (four) hours as needed for Nausea. (Patient not taking: Reported on 6/1/2024) 15 tablet 0    traMADol HCl 50 MG Oral Tab Take 1-2 tablets ( mg total) by mouth every 6 (six) hours as needed for Pain. (Patient not taking: Reported on 6/1/2024) 20 tablet 0    ondansetron 4 MG Oral Tablet Dispersible Take 1 tablet (4 mg total) by mouth every 4 (four) hours as needed for Nausea. (Patient not taking: Reported on 6/1/2024) 15 tablet 0    Dicyclomine HCl 20 MG Oral Tab Take 1 tablet (20 mg total) by mouth 4 (four) times daily as needed (Abdominal pain). (Patient not taking: Reported on 6/1/2024) 30  tablet 0    Olopatadine HCl (PATANOL) 0.1 % Ophthalmic Solution Place 1 drop into both eyes 2 (two) times daily. (Patient not taking: Reported on 6/1/2024) 5 mL 0    Ketotifen Fumarate (ALAWAY) 0.025 % Ophthalmic Solution Place 1 drop into the left eye 2 (two) times daily. (Patient not taking: Reported on 6/1/2024) 5 mL 0    Norethin-Eth Estrad Triphasic (ORTHO-NOVUM 7/7/7, 28,) 0.5/0.75/1-35 MG-MCG Oral Tab Take 1 tablet by mouth daily. (Patient not taking: Reported on 6/1/2024) 1 Package 2    hydrocodone-ibuprofen 7.5-200 MG Oral Tab  (Patient not taking: Reported on 6/1/2024)      Montelukast Sodium 10 MG Oral Tab Take 1 tablet (10 mg total) by mouth nightly. (Patient not taking: Reported on 6/1/2024) 30 tablet 0    Mometasone Furoate (NASONEX) 50 MCG/ACT Nasal Suspension 2 sprays by Nasal route 2 (two) times daily. (Patient not taking: Reported on 6/1/2024) 1 Inhaler 0    Fluticasone-Salmeterol (ADVAIR HFA) 115-21 MCG/ACT Inhalation Aerosol Inhale 2 puffs into the lungs 2 (two) times daily. (Patient not taking: Reported on 6/1/2024) 1 Inhaler 0    Albuterol Sulfate (2.5 MG/3ML) 0.083% Inhalation Nebu Soln Inhale 2.5 mg into the lungs. (Patient not taking: Reported on 6/1/2024)      TraMADol HCl (ULTRAM) 50 MG Oral Tab Take 50 mg by mouth every 6 (six) hours as needed for Pain. (Patient not taking: Reported on 6/1/2024)       Patient Active Problem List   Diagnosis    Family history of breast cancer in mother    Asthma in adult (HCC)    Abnormal uterine bleeding (AUB)    Postcoital bleeding    Metrorrhagia     Family History   Problem Relation Age of Onset    Cancer Mother         breast    Cancer Maternal Grandfather         pancreatic     Cancer Paternal Uncle         throat        REVIEW OF SYSTEMS:  GENERAL: feels well otherwise  SKIN: denies any unusual skin lesions  EYES:denies vision change  LUNGS: denies shortness of breath with exertion  CARDIOVASCULAR: denies chest pain on exertion  GI: denies abdominal  pain.  No N/V/D/C  : denies dysuria  MUSCULOSKELETAL: denies back pain  NEURO: sinus headaches    EXAM:  /90   Pulse 101   Temp 97.1 °F (36.2 °C)   Resp 18   Ht 5' 7\" (1.702 m)   Wt 180 lb (81.6 kg)   LMP 05/17/2024 (Approximate)   SpO2 99%   BMI 28.19 kg/m²   Physical Exam  Constitutional:       General: She is not in acute distress.     Appearance: Normal appearance. She is not ill-appearing, toxic-appearing or diaphoretic.   HENT:      Head: Normocephalic and atraumatic.      Right Ear: Tympanic membrane and ear canal normal.      Left Ear: Tympanic membrane and ear canal normal.      Nose: Congestion present.      Mouth/Throat:      Mouth: Mucous membranes are moist.      Pharynx: Oropharynx is clear.   Eyes:      Conjunctiva/sclera: Conjunctivae normal.      Pupils: Pupils are equal, round, and reactive to light.   Cardiovascular:      Rate and Rhythm: Normal rate and regular rhythm.      Heart sounds: Normal heart sounds.   Pulmonary:      Effort: Pulmonary effort is normal.      Breath sounds: Normal breath sounds.   Musculoskeletal:      Cervical back: Neck supple. No rigidity.   Lymphadenopathy:      Cervical: No cervical adenopathy.   Skin:     General: Skin is warm and dry.   Neurological:      General: No focal deficit present.      Mental Status: She is alert and oriented to person, place, and time.   Psychiatric:         Mood and Affect: Mood normal.         Behavior: Behavior normal.        ASSESSMENT AND PLAN:  Margarita Murray is a 43 year old female.  Encounter Diagnoses   Name Primary?    Acute non-recurrent maxillary sinusitis Yes    Mild intermittent asthma with exacerbation (HCC)     Asthma in adult without complication, unspecified asthma severity, unspecified whether persistent (HCC)        No results found.     No orders of the defined types were placed in this encounter.    Meds & Refills for this Visit:  Requested Prescriptions     Signed Prescriptions Disp Refills     amoxicillin clavulanate 875-125 MG Oral Tab 20 tablet 0     Sig: Take 1 tablet by mouth 2 (two) times daily for 10 days.    predniSONE 20 MG Oral Tab 10 tablet 0     Sig: Take 2 tablets (40 mg total) by mouth daily for 5 days.    albuterol (PROAIR HFA) 108 (90 Base) MCG/ACT Inhalation Aero Soln 1 each 0     Si-2 puffs every 4-6 hours for 2-3 days then as needed.    albuterol (2.5 MG/3ML) 0.083% Inhalation Nebu Soln 100 each 0     Sig: Take 3 mL (2.5 mg total) by nebulization every 4 (four) hours as needed for Wheezing.    Beclomethasone Diprop HFA 80 MCG/ACT Inhalation Aerosol, Breath Activated 1 each 0     Sig: Inhale 2 puffs into the lungs in the morning and 2 puffs before bedtime.     Imaging & Consults:  None    Meds as above.  Discussed how to use medications.  Fluids, steam, rest.  Go to ED with dyspnea.    Follow up with PCP if symptoms change, worsen, do not resolve.    Patient verbalizes understanding of plan.  No follow-ups on file.

## 2024-06-19 RX ORDER — PREDNISONE 20 MG/1
40 TABLET ORAL DAILY
Qty: 10 TABLET | Refills: 0 | OUTPATIENT
Start: 2024-06-19

## 2024-07-17 RX ORDER — AMOXICILLIN AND CLAVULANATE POTASSIUM 875; 125 MG/1; MG/1
1 TABLET, FILM COATED ORAL 2 TIMES DAILY
Qty: 20 TABLET | Refills: 0 | OUTPATIENT
Start: 2024-07-17

## 2024-07-17 RX ORDER — ALBUTEROL SULFATE 90 UG/1
AEROSOL, METERED RESPIRATORY (INHALATION)
Qty: 6.7 G | Refills: 0 | OUTPATIENT
Start: 2024-07-17

## (undated) NOTE — ED AVS SNAPSHOT
Randye Gosselin   MRN: [id number]    Department:  [facility] Emergency Department   Date of Visit:  8/30/2019           Disclosure     Insurance plans vary and the physician(s) referred by the ER may not be covered by your plan.  Please contact CARE PHYSICIAN AT ONCE OR RETURN IMMEDIATELY TO THE EMERGENCY DEPARTMENT. If you have been prescribed any medication(s), please fill your prescription right away and begin taking the medication(s) as directed.   If you believe that any of the medications

## (undated) NOTE — ED AVS SNAPSHOT
Tino Waddell   MRN: [id number]    Department:  [facility] Emergency Department   Date of Visit:  6/10/2019           Disclosure     Insurance plans vary and the physician(s) referred by the ER may not be covered by your plan.  Please contact CARE PHYSICIAN AT ONCE OR RETURN IMMEDIATELY TO THE EMERGENCY DEPARTMENT. If you have been prescribed any medication(s), please fill your prescription right away and begin taking the medication(s) as directed.   If you believe that any of the medications

## (undated) NOTE — ED AVS SNAPSHOT
Gil Soriano   MRN: [id number]    Department:  [facility] Emergency Department   Date of Visit:  6/25/2018           Disclosure     Insurance plans vary and the physician(s) referred by the ER may not be covered by your plan.  Please contact CARE PHYSICIAN AT ONCE OR RETURN IMMEDIATELY TO THE EMERGENCY DEPARTMENT. If you have been prescribed any medication(s), please fill your prescription right away and begin taking the medication(s) as directed.   If you believe that any of the medications

## (undated) NOTE — Clinical Note
6/12/2017              Kathleen Murray        [facility] [facility]         Dear Denise Rothman,    Our records indicate that the tests ordered for you by Geremias Connors MD  have not been done.   If you have, in fact, already completed the t

## (undated) NOTE — ED AVS SNAPSHOT
Radha Pardo   MRN: [id number]    Department:  [facility] Emergency Department   Date of Visit:  9/1/2019           Disclosure     Insurance plans vary and the physician(s) referred by the ER may not be covered by your plan.  Please contact y CARE PHYSICIAN AT ONCE OR RETURN IMMEDIATELY TO THE EMERGENCY DEPARTMENT. If you have been prescribed any medication(s), please fill your prescription right away and begin taking the medication(s) as directed.   If you believe that any of the medications

## (undated) NOTE — MR AVS SNAPSHOT
[facility]  [address] [place] [id number] [phone]               Thank you for choosing us for your health care visit with Fanny Byers MD.  We are glad to serve you and happy to provide you with this summary of your visit. Mometasone Furoate 50 MCG/ACT Susp   2 sprays by Nasal route 2 (two) times daily. Commonly known as:  NASONEX           Montelukast Sodium 10 MG Tabs   Take 1 tablet (10 mg total) by mouth nightly.    Commonly known as:  SINGULAIR           NECON 0.5/35 Choose whole grain products Foods high in sodium   Water is best for hydration Fast food.    Eat at home when possible     Tips for increasing your physical activity – Adults who are physically active are less likely to develop some chronic diseases than ad